# Patient Record
Sex: MALE | Race: WHITE | NOT HISPANIC OR LATINO | URBAN - METROPOLITAN AREA
[De-identification: names, ages, dates, MRNs, and addresses within clinical notes are randomized per-mention and may not be internally consistent; named-entity substitution may affect disease eponyms.]

---

## 2017-02-27 ENCOUNTER — IMPORTED ENCOUNTER (OUTPATIENT)
Dept: URBAN - METROPOLITAN AREA CLINIC 50 | Facility: CLINIC | Age: 82
End: 2017-02-27

## 2017-10-23 ENCOUNTER — IMPORTED ENCOUNTER (OUTPATIENT)
Dept: URBAN - METROPOLITAN AREA CLINIC 50 | Facility: CLINIC | Age: 82
End: 2017-10-23

## 2018-03-12 ENCOUNTER — IMPORTED ENCOUNTER (OUTPATIENT)
Dept: URBAN - METROPOLITAN AREA CLINIC 50 | Facility: CLINIC | Age: 83
End: 2018-03-12

## 2018-03-12 NOTE — PATIENT DISCUSSION
"""Dr. Justyna Madsen to review test results with patient."" ""Based on increased c/d ratio and/or ocular hypertension

## 2018-03-19 ENCOUNTER — IMPORTED ENCOUNTER (OUTPATIENT)
Dept: URBAN - METROPOLITAN AREA CLINIC 50 | Facility: CLINIC | Age: 83
End: 2018-03-19

## 2021-04-17 ASSESSMENT — TONOMETRY
OS_IOP_MMHG: 14
OD_IOP_MMHG: 19
OS_IOP_MMHG: 15
OD_IOP_MMHG: 12
OS_IOP_MMHG: 19
OD_IOP_MMHG: 14
OS_IOP_MMHG: 12

## 2021-04-17 ASSESSMENT — VISUAL ACUITY
OS_CC: J1@ 13 IN
OS_SC: 20/30-
OS_SC: 20/50-1
OD_CC: 20/20-
OS_PH: 20/40
OD_SC: 20/40
OD_CC: J1@ 13 IN
OD_SC: 20/30
OS_CC: 20/25+

## 2021-04-17 ASSESSMENT — PACHYMETRY
OD_CT_UM: 538
OD_CT_UM: 538
OS_CT_UM: 528
OD_CT_UM: 538